# Patient Record
Sex: FEMALE | Race: WHITE | NOT HISPANIC OR LATINO | URBAN - METROPOLITAN AREA
[De-identification: names, ages, dates, MRNs, and addresses within clinical notes are randomized per-mention and may not be internally consistent; named-entity substitution may affect disease eponyms.]

---

## 2019-11-13 ASSESSMENT — MIFFLIN-ST. JEOR: SCORE: 1349.21

## 2019-11-18 ENCOUNTER — SURGERY - HEALTHEAST (OUTPATIENT)
Dept: SURGERY | Facility: CLINIC | Age: 64
End: 2019-11-18

## 2019-11-18 ENCOUNTER — ANESTHESIA - HEALTHEAST (OUTPATIENT)
Dept: SURGERY | Facility: CLINIC | Age: 64
End: 2019-11-18

## 2019-11-18 ASSESSMENT — MIFFLIN-ST. JEOR
SCORE: 1327.1
SCORE: 1327.1

## 2019-11-19 ASSESSMENT — MIFFLIN-ST. JEOR: SCORE: 1376.88

## 2019-11-20 ENCOUNTER — SURGERY - HEALTHEAST (OUTPATIENT)
Dept: CARDIOLOGY | Facility: CLINIC | Age: 64
End: 2019-11-20

## 2019-11-20 ASSESSMENT — MIFFLIN-ST. JEOR
SCORE: 1371.89
SCORE: 1362.36

## 2019-11-21 ASSESSMENT — MIFFLIN-ST. JEOR: SCORE: 1364.18

## 2019-11-22 ASSESSMENT — MIFFLIN-ST. JEOR: SCORE: 1365.08

## 2019-11-23 ENCOUNTER — HOME CARE/HOSPICE - HEALTHEAST (OUTPATIENT)
Dept: HOME HEALTH SERVICES | Facility: HOME HEALTH | Age: 64
End: 2019-11-23

## 2019-11-23 ASSESSMENT — MIFFLIN-ST. JEOR: SCORE: 1365.08

## 2021-06-03 NOTE — ANESTHESIA PREPROCEDURE EVALUATION
Anesthesia Evaluation      No history of anesthetic complications     Airway   Mallampati: II  Neck ROM: full   Pulmonary - normal exam   (-) sleep apnea                         Cardiovascular   Exercise tolerance: > or = 4 METS  (+) hypertension well controlled, dysrhythmias, ,     ECG reviewed  Rhythm: regular  Rate: normal,      ROS comment: bradycardia     Neuro/Psych - negative ROS     Endo/Other    (+) hypothyroidism,      GI/Hepatic/Renal - negative ROS           Dental - normal exam                        Anesthesia Plan  Planned anesthetic: spinal  Discussed rare risks of bleeding, infection, nerve damage, failure and LAST. We discussed risks of headache, failure and low blood pressure. Pt wishes to proceed    ASA 2     Anesthetic plan and risks discussed with: patient

## 2021-06-03 NOTE — ANESTHESIA CARE TRANSFER NOTE
Last vitals:   Vitals:    11/18/19 1805   BP: 127/60   Pulse: (!) 116   Resp: 16   Temp: 36.9  C (98.4  F)   SpO2: 98%     Patient's level of consciousness is awake  Spontaneous respirations: yes  Maintains airway independently: yes  Dentition unchanged: yes  Oropharynx: oropharynx clear of all foreign objects    QCDR Measures:  ASA# 20 - Surgical Safety Checklist: WHO surgical safety checklist completed prior to induction    PQRS# 430 - Adult PONV Prevention: 4558F - Pt received => 2 anti-emetic agents (different classes) preop & intraop  ASA# 8 - Peds PONV Prevention: NA - Not pediatric patient, not GA or 2 or more risk factors NOT present  PQRS# 424 - Petra-op Temp Management: 4559F - At least one body temp DOCUMENTED => 35.5C or 95.9F within required timeframe  PQRS# 426 - PACU Transfer Protocol: - Transfer of care checklist used  ASA# 14 - Acute Post-op Pain: ASA14B - Patient did NOT experience pain >= 7 out of 10

## 2021-06-03 NOTE — ANESTHESIA POSTPROCEDURE EVALUATION
Patient: Elina Murphy  RIGHT TOTAL HIP ARTHROPLASTY  Anesthesia type: spinal    Patient location: PACU  Last vitals:   Vitals Value Taken Time   /63 11/18/2019  6:50 PM   Temp 36.8  C (98.2  F) 11/18/2019  6:40 PM   Pulse 85 11/18/2019  6:50 PM   Resp 10 11/18/2019  6:50 PM   SpO2 100 % 11/18/2019  6:50 PM     Post vital signs: stable  Level of consciousness: awake and responds to simple questions  Post-anesthesia pain: pain controlled  Post-anesthesia nausea and vomiting: no  Pulmonary: unassisted, return to baseline  Cardiovascular: stable and blood pressure at baseline  Hydration: adequate  Anesthetic events: no    QCDR Measures:  ASA# 11 - Petra-op Cardiac Arrest: ASA11B - Patient did NOT experience unanticipated cardiac arrest  ASA# 12 - Petra-op Mortality Rate: ASA12B - Patient did NOT die  ASA# 13 - PACU Re-Intubation Rate: NA - No ETT / LMA used for case  ASA# 10 - Composite Anes Safety: ASA10A - No serious adverse event    Additional Notes:

## 2021-06-03 NOTE — ANESTHESIA PROCEDURE NOTES
Spinal Block    Patient location during procedure: OR  Start time: 11/18/2019 3:50 PM  End time: 11/18/2019 3:56 PM  Reason for block: primary anesthetic    Staffing:  Performing  Anesthesiologist: Kostas Ferguson MD    Preanesthetic Checklist  Completed: patient identified, risks, benefits, and alternatives discussed, timeout performed, consent obtained, airway assessed, oxygen available, suction available, emergency drugs available and hand hygiene performed  Spinal Block  Patient position: sitting  Prep: ChloraPrep  Patient monitoring: heart rate, cardiac monitor, continuous pulse ox and blood pressure  Approach: midline  Location: L3-4  Injection technique: single-shot  Needle type: pencil-tip   Needle gauge: 24 G      Additional Notes:  Negative paresthesia or heme.    Clear CSF on first attempt.

## 2021-06-04 VITALS — HEIGHT: 67 IN | BODY MASS INDEX: 27.07 KG/M2 | WEIGHT: 172.5 LBS

## 2021-06-04 VITALS — BODY MASS INDEX: 26.98 KG/M2 | WEIGHT: 171.9 LBS | HEIGHT: 67 IN

## 2021-06-16 PROBLEM — M16.11 PRIMARY OSTEOARTHRITIS OF RIGHT HIP: Status: ACTIVE | Noted: 2019-11-18

## 2021-06-16 PROBLEM — Z95.0 CARDIAC PACEMAKER IN SITU: Status: ACTIVE | Noted: 2019-11-20

## 2021-06-16 PROBLEM — I46.9 CARDIAC ASYSTOLE (H): Status: ACTIVE | Noted: 2019-11-20

## 2021-06-16 PROBLEM — R55 VASOVAGAL SYNCOPE: Status: ACTIVE | Noted: 2019-11-19
